# Patient Record
Sex: FEMALE | Race: WHITE | Employment: FULL TIME | URBAN - METROPOLITAN AREA
[De-identification: names, ages, dates, MRNs, and addresses within clinical notes are randomized per-mention and may not be internally consistent; named-entity substitution may affect disease eponyms.]

---

## 2021-01-18 ENCOUNTER — HOSPITAL ENCOUNTER (EMERGENCY)
Facility: CLINIC | Age: 36
Discharge: HOME | End: 2021-01-18
Attending: EMERGENCY MEDICINE

## 2021-01-18 ENCOUNTER — APPOINTMENT (OUTPATIENT)
Dept: GENERAL RADIOLOGY | Facility: CLINIC | Age: 36
End: 2021-01-18

## 2021-01-18 ENCOUNTER — TELEPHONE (OUTPATIENT)
Dept: ORTHOPEDIC SURGERY | Age: 36
End: 2021-01-18

## 2021-01-18 VITALS
RESPIRATION RATE: 18 BRPM | OXYGEN SATURATION: 97 % | BODY MASS INDEX: 44.08 KG/M2 | DIASTOLIC BLOOD PRESSURE: 88 MMHG | HEART RATE: 95 BPM | HEIGHT: 58 IN | TEMPERATURE: 98.8 F | SYSTOLIC BLOOD PRESSURE: 138 MMHG | WEIGHT: 210 LBS

## 2021-01-18 DIAGNOSIS — S93.401A SPRAIN OF RIGHT ANKLE, UNSPECIFIED LIGAMENT, INITIAL ENCOUNTER: Primary | ICD-10-CM

## 2021-01-18 PROCEDURE — 73610 X-RAY EXAM OF ANKLE: CPT

## 2021-01-18 PROCEDURE — 99284 EMERGENCY DEPT VISIT MOD MDM: CPT

## 2021-01-18 PROCEDURE — 99283 EMERGENCY DEPT VISIT LOW MDM: CPT

## 2021-01-18 RX ORDER — HYDROCODONE BITARTRATE AND ACETAMINOPHEN 5; 325 MG/1; MG/1
1 TABLET ORAL EVERY 6 HOURS PRN
Qty: 12 TABLET | Refills: 0 | Status: SHIPPED | OUTPATIENT
Start: 2021-01-18 | End: 2021-01-21

## 2021-01-18 ASSESSMENT — PAIN DESCRIPTION - PAIN TYPE: TYPE: ACUTE PAIN

## 2021-01-18 ASSESSMENT — PAIN DESCRIPTION - ORIENTATION: ORIENTATION: RIGHT

## 2021-01-18 NOTE — ED PROVIDER NOTES
[address] [facility]      Pt Name: Afshin Leroy  MRN: [id number]  [place] 1985  Date of evaluation: 1/18/2021      CHIEF COMPLAINT       Chief Complaint   Patient presents with    Ankle Pain         HISTORY OF PRESENT ILLNESS      The patient presents with right ankle pain. She was descending some stairs and missed a step couple days ago. She is having pain and swelling over the lateral aspect of her ankle. She is able to bear weight though with discomfort. She denies other injury. She has been taking Tylenol and Motrin. She has been wrapping the ankle. Pain is worse with weightbearing and better with rest.      REVIEW OF SYSTEMS       All systems reviewed and negative unless noted in HPI. The patient denies fever or constitutional symptoms. Denies any neck pain or stiffness. Right ankle injury as noted in HPI. Denies any weakness, numbness or focal neurologic deficit. Denies any skin rash or edema. No recent psychiatric issues. No easy bruising or bleeding. Denies any polyuria, polydypsia or history of immunocompromise. PAST MEDICAL HISTORY    has a past medical history of Anxiety and Thyroid disease. SURGICAL HISTORY      has a past surgical history that includes Cholecystectomy. CURRENT MEDICATIONS       Previous Medications    No medications on file       ALLERGIES     has No Known Allergies. FAMILY HISTORY     has no family status information on file. family history is not on file. SOCIAL HISTORY      reports that she has been smoking cigarettes. She has been smoking about 0.50 packs per day. She does not have any smokeless tobacco history on file. She reports that she does not drink alcohol or use drugs. PHYSICAL EXAM     INITIAL VITALS:  height is 4' 10\" (1.473 m) and weight is 95.3 kg (210 lb). Her oral temperature is 98.8 °F (37.1 °C). Her blood pressure is 138/88 and her pulse is 95.  Her respiration is 18 and oxygen saturation is 97%. The patient is alert and oriented, in no apparent distress. HEENT is atraumatic. Pupils are PERRL at 4 mm. Mucous membranes moist.    Neck is supple with no pain or step-off. Heart sounds regular rate and rhythm with no gallops, murmurs, or rubs. Lungs clear, no wheezes, rales or rhonchi. Musculoskeletal exam: Lamination of right lower extremity: No pain in the knee. No pain in the proximal fibular tibia. Moderate swelling and ecchymosis noted over the lateral malleolar area. No pain over the medial malleolus. Normal dorsalis pedis pulse. The remainder of the musculoskeletal exam is unremarkable. Skin: no rash or edema. Neurological exam reveals cranial nerves 2 through 12 grossly intact. Patient has equal  and normal deep tendon reflexes. DIFFERENTIAL DIAGNOSIS/ MDM:     Sprain, fracture    DIAGNOSTIC RESULTS       RADIOLOGY:   I reviewed the radiologist interpretations:  XR ANKLE RIGHT (MIN 3 VIEWS)   Final Result   Ankle soft tissue swelling. XR ANKLE RIGHT (MIN 3 VIEWS) (Final result)  Result time 01/18/21 15:42:37  Final result by Lauryn Verma MD (01/18/21 15:42:37)                Impression:    Ankle soft tissue swelling. Narrative:    EXAMINATION:   THREE XRAY VIEWS OF THE RIGHT ANKLE     1/18/2021 3:27 pm     COMPARISON:   None. HISTORY:   ORDERING SYSTEM PROVIDED HISTORY: fall   TECHNOLOGIST PROVIDED HISTORY:   fall   Reason for Exam: Right ankle pain s/p fall   Acuity: Acute   Type of Exam: Initial     FINDINGS:   Ankle soft tissue swelling.  No acute fracture.  Joint spaces are preserved.    No widening of the ankle mortise.                     EMERGENCY DEPARTMENT COURSE:   Vitals:    Vitals:    01/18/21 1510   BP: 138/88   Pulse: 95   Resp: 18   Temp: 98.8 °F (37.1 °C)   TempSrc: Oral   SpO2: 97%   Weight: 95.3 kg (210 lb)   Height: 4' 10\" (1.473 m) -------------------------  BP: 138/88, Temp: 98.8 °F (37.1 °C), Pulse: 95, Resp: 18      Re-evaluation Notes    The patient was provided an Aircast and crutches. I have written for medicine for pain. I given her referral to an orthopedist.  She is discharged in good condition. Controlled Substance Monitoring:    Acute and Chronic Pain Monitoring:   No flowsheet data found. PROCEDURES:    An Aircast was applied by the nurse. The patient had good color, sensation, and motion of the toes after placement. FINAL IMPRESSION      1. Sprain of right ankle, unspecified ligament, initial encounter          DISPOSITION/PLAN   DISPOSITION Decision To Discharge 01/18/2021 03:49:39 PM      Condition on Disposition    good    PATIENT REFERRED TO:  Radha Lay MD  Don Fine  [phone]    In 2 days        DISCHARGE MEDICATIONS:  New Prescriptions    HYDROCODONE-ACETAMINOPHEN (NORCO) 5-325 MG PER TABLET    Take 1 tablet by mouth every 6 hours as needed for Pain for up to 3 days.        (Please note that portions of this note were completed with a voice recognition program.  Efforts were made to edit the dictations but occasionally words are mis-transcribed.)    Lechuga MD   Attending Emergency Physician       Emilio Nichols MD  01/18/21 2546

## 2021-01-18 NOTE — LETTER
[facility] ED  [address] [facility]  Phone: [phone]               January 18, 2021    Patient: Vicenta Scanlon   YOB: 1985   Date of Visit: 1/18/2021       To Whom It May Concern:    Vicenta Scanlon was seen and treated in our emergency department on 1/18/2021. She may return to work on 01/20/21.       Sincerely,       Angelica Lambert MD         Signature:__________________________________

## 2021-01-19 ENCOUNTER — OFFICE VISIT (OUTPATIENT)
Dept: ORTHOPEDIC SURGERY | Age: 36
End: 2021-01-19

## 2021-01-19 VITALS — BODY MASS INDEX: 44.08 KG/M2 | HEIGHT: 58 IN | TEMPERATURE: 97.2 F | WEIGHT: 210 LBS | RESPIRATION RATE: 16 BRPM

## 2021-01-19 DIAGNOSIS — S93.401A SPRAIN OF RIGHT ANKLE, UNSPECIFIED LIGAMENT, INITIAL ENCOUNTER: Primary | ICD-10-CM

## 2021-01-19 DIAGNOSIS — M25.571 RIGHT ANKLE PAIN, UNSPECIFIED CHRONICITY: Primary | ICD-10-CM

## 2021-01-19 PROCEDURE — 99204 OFFICE O/P NEW MOD 45 MIN: CPT | Performed by: ORTHOPAEDIC SURGERY

## 2021-01-19 RX ORDER — NAPROXEN 500 MG/1
500 TABLET ORAL 2 TIMES DAILY PRN
Qty: 60 TABLET | Refills: 0 | Status: SHIPPED | OUTPATIENT
Start: 2021-01-19

## 2021-01-19 NOTE — PROGRESS NOTES
[facility]  [facility]  [address] [place] [address]  Dept: [phone]    Ambulatory Orthopedic Consult      CHIEF COMPLAINT:    Chief Complaint   Patient presents with    Ankle Pain     right ankle       HISTORY OF PRESENT ILLNESS:      The patient is a 37 y.o. female who is being seen for evaluation of pain at the above location at the ankle medially greater than laterally, secondary to an injury that occurred 1/16/2021 secondary to a fall down 1 stair. The pain is described mainly with mechanical terms (dull/sharp/throbbing). The pain is worse with activity and better with rest. The patient reports an associated swelling. She was recently seen in the emergency department for this. She denies a history of ankle sprains prior to this. REVIEW OF SYSTEMS:  Constitutional: Negative for fever. HENT: Negative for tinnitus. Eyes: Negative for pain. Respiratory: Negative for shortness of breath. Cardiovascular: Negative for chest pain. Gastrointestinal: Negative for abdominal pain. Genitourinary: Negative for dysuria. Skin: Negative for rash. Neurological: Negative for headaches. Hematological: Does not bruise/bleed easily. Musculoskeletal: See HPI for pertinent positives     Past Medical History:    She  has a past medical history of Anxiety and Thyroid disease. Past Surgical History:    She  has a past surgical history that includes Cholecystectomy. Current Medications:     Current Outpatient Medications:     naproxen (EC NAPROSYN) 500 MG EC tablet, Take 1 tablet by mouth 2 times daily as needed for Pain, Disp: 60 tablet, Rfl: 0    HYDROcodone-acetaminophen (NORCO) 5-325 MG per tablet, Take 1 tablet by mouth every 6 hours as needed for Pain for up to 3 days. , Disp: 12 tablet, Rfl: 0     Allergies:    Patient has no known allergies. Family History:  family history is not on file.     Social History: Social History     Occupational History    Not on file   Tobacco Use    Smoking status: Current Every Day Smoker     Packs/day: 0.50     Types: Cigarettes   Substance and Sexual Activity    Alcohol use: Never     Frequency: Never    Drug use: Never    Sexual activity: Not on file     Occupation:  full-time, works on her feet     OBJECTIVE:  Temp 97.2 °F (36.2 °C)   Resp 16   Ht 4' 10\" (1.473 m)   Wt 210 lb (95.3 kg)   LMP 01/18/2021 (Exact Date)   BMI 43.89 kg/m²    Psych: alert and oriented to person, time, and place  Cardio:  well perfused extremities  Resp:  normal respiratory effort  Skin:  no cyanosis  Hem/lymph:  no lymphedema  Neuro:  sensation to light touch grossly intact throughout all nerve distributions in the foot   Musculoskeletal:    MUSCULOSKELETAL (right lower extremity):  Vascular: Toes warm and well perfused, compartments soft/compressible, mild swelling of ankle/foot. Skin:  Intact over foot/ankle, without rash/lesions/AV malformations. Motion: Able to wiggle toes   -Range of motion not tested due to pain  -No tenderness at knee   -Tenderness to palpation: Lateral and medial hindfoot    -Able to invert/sara the foot, and plantar flex/dorsiflex appropriately      RADIOLOGY:   1/19/2021 FINDINGS:  Three weightbearing views (AP, Mortise, and Lateral) of the right ankle and three weightbearing views (AP, Oblique, Lateral) of the right foot were obtained in the office today and reviewed, revealing no acute fracture, dislocation, or radioopaque foreign body/tumor. The ankle mortise is maintained with no widening of the clear spaces. Overall alignment is satisfactory. IMPRESSION:  No acute fracture/dislocation. Electronically signed by Kinjal Luna MD        Xr Ankle Right (min 3 Views)  Result Date: 1/18/2021  Ankle soft tissue swelling. ASSESSMENT AND PLAN:  Body mass index is 43.89 kg/m². She has a right ankle sprain, sustained on 1/16/2021.  naproxen (EC NAPROSYN) 500 MG EC tablet     Sig: Take 1 tablet by mouth 2 times daily as needed for Pain     Dispense:  60 tablet     Refill:  0     Orders Placed This Encounter   Procedures    Ambulatory referral to Physical Therapy     Referral Priority:   Routine     Referral Type:   Eval and Treat     Requested Specialty:   Physical Therapy     Number of Visits Requested:   1         Leatha Ceron MD  Orthopedic Surgery        Please excuse any typos/errors, as this note was created with the assistance of voice recognition software. While intending to generate a document that actually reflects the content of the visit, the document can still have some errors including those of syntax and sound-a-like substitutions which may escape proof reading. In such instances, actual meaning can be extrapolated by context.

## 2021-01-19 NOTE — LETTER
[address] [place] [address] and [place] [address]  Phone: [phone]  Fax: [phone]    Rula Hill MD        January 19, 2021     Patient: Zenon Vora   YOB: 1985   Date of Visit: 1/19/2021       To Whom It May Concern: It is my medical opinion that Zenon Vora remain off work until 1/26/2021. At this time she may return to work with no restrictions. If you have any questions or concerns, please don't hesitate to call.     Sincerely,        Rula Hill MD